# Patient Record
Sex: MALE | Race: WHITE | HISPANIC OR LATINO | URBAN - METROPOLITAN AREA
[De-identification: names, ages, dates, MRNs, and addresses within clinical notes are randomized per-mention and may not be internally consistent; named-entity substitution may affect disease eponyms.]

---

## 2021-08-31 ENCOUNTER — OFFICE VISIT (OUTPATIENT)
Dept: URBAN - METROPOLITAN AREA CLINIC 115 | Facility: CLINIC | Age: 46
End: 2021-08-31
Payer: SELF-PAY

## 2021-08-31 ENCOUNTER — DASHBOARD ENCOUNTERS (OUTPATIENT)
Age: 46
End: 2021-08-31

## 2021-08-31 ENCOUNTER — LAB OUTSIDE AN ENCOUNTER (OUTPATIENT)
Dept: URBAN - METROPOLITAN AREA CLINIC 115 | Facility: CLINIC | Age: 46
End: 2021-08-31

## 2021-08-31 ENCOUNTER — OFFICE VISIT (OUTPATIENT)
Dept: URBAN - METROPOLITAN AREA CLINIC 115 | Facility: CLINIC | Age: 46
End: 2021-08-31

## 2021-08-31 VITALS
BODY MASS INDEX: 25.9 KG/M2 | TEMPERATURE: 97.7 F | DIASTOLIC BLOOD PRESSURE: 59 MMHG | HEART RATE: 70 BPM | HEIGHT: 67 IN | WEIGHT: 165 LBS | RESPIRATION RATE: 16 BRPM | SYSTOLIC BLOOD PRESSURE: 108 MMHG

## 2021-08-31 DIAGNOSIS — R10.30 LOWER ABDOMINAL PAIN: ICD-10-CM

## 2021-08-31 DIAGNOSIS — F17.200 SMOKER UNMOTIVATED TO QUIT: ICD-10-CM

## 2021-08-31 DIAGNOSIS — K62.5 RECTAL BLEEDING: ICD-10-CM

## 2021-08-31 DIAGNOSIS — R63.4 ABNORMAL WEIGHT LOSS: ICD-10-CM

## 2021-08-31 PROBLEM — 77176002: Status: ACTIVE | Noted: 2021-08-31

## 2021-08-31 PROBLEM — 267024001: Status: ACTIVE | Noted: 2021-08-31

## 2021-08-31 PROBLEM — 54586004: Status: ACTIVE | Noted: 2021-08-31

## 2021-08-31 PROCEDURE — 99204 OFFICE O/P NEW MOD 45 MIN: CPT | Performed by: INTERNAL MEDICINE

## 2021-08-31 NOTE — HPI-OTHER HISTORIES
47 y/o Man with 2 episodes of rectal bleeding, refer abnormal weight loss, chronic smoker. Refer prior episode of abdominal pain but no pain anymore. PCP do routine labs which were unremarkable ,including UBT.Normal hgb, liver enzymes and ferritin. Given smoking, weight loss and rectal bleeding colonoscopy is recommended.

## 2021-09-01 PROBLEM — 12063002: Status: ACTIVE | Noted: 2021-08-31

## 2021-09-29 ENCOUNTER — OFFICE VISIT (OUTPATIENT)
Dept: URBAN - METROPOLITAN AREA SURGERY CENTER 13 | Facility: SURGERY CENTER | Age: 46
End: 2021-09-29
Payer: SELF-PAY

## 2021-09-29 DIAGNOSIS — K62.5 ANAL BLEEDING: ICD-10-CM

## 2021-09-29 PROCEDURE — G8907 PT DOC NO EVENTS ON DISCHARG: HCPCS | Performed by: INTERNAL MEDICINE

## 2021-09-29 PROCEDURE — 45378 DIAGNOSTIC COLONOSCOPY: CPT | Performed by: INTERNAL MEDICINE
